# Patient Record
Sex: FEMALE | Race: WHITE | Employment: PART TIME | URBAN - NONMETROPOLITAN AREA
[De-identification: names, ages, dates, MRNs, and addresses within clinical notes are randomized per-mention and may not be internally consistent; named-entity substitution may affect disease eponyms.]

---

## 2019-12-03 ENCOUNTER — HOSPITAL ENCOUNTER (OUTPATIENT)
Dept: ULTRASOUND IMAGING | Age: 29
Discharge: HOME | End: 2019-12-03
Payer: MEDICAID

## 2019-12-03 DIAGNOSIS — E04.1 NONTOXIC SINGLE THYROID NODULE: ICD-10-CM

## 2019-12-03 PROCEDURE — 76536 US EXAM OF HEAD AND NECK: CPT

## 2020-03-23 ENCOUNTER — HOSPITAL ENCOUNTER (EMERGENCY)
Age: 30
Discharge: HOME | End: 2020-03-23
Payer: MEDICAID

## 2020-03-23 VITALS
OXYGEN SATURATION: 99 % | RESPIRATION RATE: 16 BRPM | SYSTOLIC BLOOD PRESSURE: 123 MMHG | HEIGHT: 60 IN | HEART RATE: 91 BPM | TEMPERATURE: 97.5 F | WEIGHT: 140 LBS | BODY MASS INDEX: 27.48 KG/M2 | DIASTOLIC BLOOD PRESSURE: 79 MMHG

## 2020-03-23 LAB
GROUP A STREP CULTURE, REFLEX: NEGATIVE
REFLEX THROAT C + S: NORMAL

## 2020-03-23 PROCEDURE — 12011 RPR F/E/E/N/L/M 2.5 CM/<: CPT | Performed by: NURSE PRACTITIONER

## 2020-03-23 PROCEDURE — 12011 RPR F/E/E/N/L/M 2.5 CM/<: CPT

## 2020-03-23 PROCEDURE — 90471 IMMUNIZATION ADMIN: CPT | Performed by: NURSE PRACTITIONER

## 2020-03-23 PROCEDURE — 87880 STREP A ASSAY W/OPTIC: CPT

## 2020-03-23 PROCEDURE — 6360000002 HC RX W HCPCS: Performed by: NURSE PRACTITIONER

## 2020-03-23 PROCEDURE — 99213 OFFICE O/P EST LOW 20 MIN: CPT | Performed by: NURSE PRACTITIONER

## 2020-03-23 PROCEDURE — 90715 TDAP VACCINE 7 YRS/> IM: CPT | Performed by: NURSE PRACTITIONER

## 2020-03-23 PROCEDURE — 99213 OFFICE O/P EST LOW 20 MIN: CPT

## 2020-03-23 PROCEDURE — 87070 CULTURE OTHR SPECIMN AEROBIC: CPT

## 2020-03-23 RX ORDER — CEPHALEXIN 250 MG/1
250 CAPSULE ORAL 4 TIMES DAILY
Qty: 28 CAPSULE | Refills: 0 | Status: SHIPPED | OUTPATIENT
Start: 2020-03-23 | End: 2020-03-30

## 2020-03-23 RX ADMIN — TETANUS TOXOID, REDUCED DIPHTHERIA TOXOID AND ACELLULAR PERTUSSIS VACCINE, ADSORBED 0.5 ML: 5; 2.5; 8; 8; 2.5 SUSPENSION INTRAMUSCULAR at 11:48

## 2020-03-23 ASSESSMENT — PAIN DESCRIPTION - DESCRIPTORS: DESCRIPTORS: ACHING

## 2020-03-23 ASSESSMENT — PAIN DESCRIPTION - FREQUENCY: FREQUENCY: CONTINUOUS

## 2020-03-23 ASSESSMENT — PAIN DESCRIPTION - PROGRESSION: CLINICAL_PROGRESSION: NOT CHANGED

## 2020-03-23 ASSESSMENT — ENCOUNTER SYMPTOMS
COUGH: 0
COLOR CHANGE: 0
RHINORRHEA: 0
SINUS CONGESTION: 0
CHEST TIGHTNESS: 0
NAUSEA: 0
TROUBLE SWALLOWING: 0
ABDOMINAL PAIN: 0
VOMITING: 0
DIARRHEA: 0
EYE DISCHARGE: 0
SHORTNESS OF BREATH: 0
SORE THROAT: 1
VOICE CHANGE: 0

## 2020-03-23 ASSESSMENT — PAIN DESCRIPTION - ONSET: ONSET: SUDDEN

## 2020-03-23 ASSESSMENT — PAIN DESCRIPTION - ORIENTATION: ORIENTATION: RIGHT

## 2020-03-23 ASSESSMENT — PAIN DESCRIPTION - PAIN TYPE: TYPE: ACUTE PAIN

## 2020-03-23 ASSESSMENT — PAIN DESCRIPTION - LOCATION: LOCATION: FACE

## 2020-03-23 ASSESSMENT — PAIN SCALES - GENERAL: PAINLEVEL_OUTOF10: 5

## 2020-03-23 NOTE — ED TRIAGE NOTES
Pt requests have a strep test, her daughter had strep 2 weeks ago. She has had a sore throat since Friday.

## 2020-03-23 NOTE — ED TRIAGE NOTES
To room with c/o 1cm laceration to right temporal area after falling down stairs. She slid down approx. 5 steps and the metal she was holding in her hand cut her. 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

## 2020-03-23 NOTE — ED PROVIDER NOTES
[facility]       CHIEF COMPLAINT       Chief Complaint   Patient presents with    Laceration    Pharyngitis       Nurses Notes reviewed and I agree except as noted in the HPI. HISTORY OF PRESENT ILLNESS   Elyssa Kelly is a 34 y.o. female who presents     Patient states that she was caring a heavy metal light, down some stairs when she lost her balance causing her to fall and heavy metal light to hit her on the right side of her face causing a small cut to her right periorbital.  She denies passing out when she fell. She is unsure when her last tetanus vaccine was. Patient makes mention that her daughter had tested positive for strep, and she herself has started to develop sore throat, therefore she is concerned that she may also be developing strep.       Pharyngitis   Location:  Generalized  Quality:  Sore  Severity:  Mild  Onset quality:  Gradual  Duration:  2 days  Progression:  Unchanged  Chronicity:  New  Relieved by:  None tried  Worsened by:  Nothing  Ineffective treatments:  None tried  Associated symptoms: no abdominal pain, no adenopathy, no chest pain, no chills, no cough, no drooling, no ear discharge, no ear pain, no epistaxis, no eye discharge, no fever, no headaches, no neck stiffness, no night sweats, no plugged ear sensation, no postnasal drip, no rash, no rhinorrhea, no shortness of breath, no sinus congestion, no trouble swallowing and no voice change    Risk factors: sick contacts (daughter postive with strep)    Risk factors: no exposure to strep, no exposure to mono, no recent dental procedure, no recent endoscopy and no recent ENT procedure      Lac Repair  Date/Time: 3/23/2020 12:00 PM  Performed by: JAMAICA Corona NP  Authorized by: JAMAICA Corona NP     Consent:     Consent obtained:  Verbal    Consent given by:  Patient    Risks discussed:  Infection, pain, poor cosmetic result, poor wound healing and need for additional repair    Alternatives discussed:  No treatment, delayed treatment and observation  Anesthesia (see MAR for exact dosages): Anesthesia method:  None  Laceration details:     Location: facial.    Length (cm):  1  Exploration:     Contaminated: no    Treatment:     Wound cleansed with: wound cleanse. Irrigation method:  Pressure wash    Visualized foreign bodies/material removed: no    Skin repair:     Repair method:  Tissue adhesive  Approximation:     Approximation:  Close  Post-procedure details:     Dressing:  Adhesive bandage    Patient tolerance of procedure: Tolerated well, no immediate complications      REVIEW OF SYSTEMS     Review of Systems   Constitutional: Negative for chills, fatigue, fever and night sweats. HENT: Positive for sore throat. Negative for congestion, drooling, ear discharge, ear pain, mouth sores, nosebleeds, postnasal drip, rhinorrhea, trouble swallowing and voice change. Eyes: Negative for discharge. Respiratory: Negative for cough, chest tightness and shortness of breath. Cardiovascular: Negative for chest pain. Gastrointestinal: Negative for abdominal pain, diarrhea, nausea and vomiting. Musculoskeletal: Negative for neck pain and neck stiffness. Skin: Positive for wound (laceration to right periorbital). Negative for color change, pallor and rash. Neurological: Negative for dizziness, weakness, light-headedness, numbness and headaches. Hematological: Negative for adenopathy. PAST MEDICAL HISTORY   History reviewed. No pertinent past medical history. SURGICALHISTORY     Patient  has no past surgical history on file. CURRENT MEDICATIONS       Previous Medications    No medications on file       ALLERGIES     Patient is has No Known Allergies.     Patients   Immunization History   Administered Date(s) Administered    Tdap (Boostrix, Adacel) 03/23/2020       FAMILY HISTORY     Patient's family history includes High Blood Pressure in her mother. SOCIAL HISTORY     Patient  reports that she has never smoked. She has never used smokeless tobacco. She reports current alcohol use. She reports that she does not use drugs. PHYSICAL EXAM     ED TRIAGE VITALS  BP: 125/76, Temp: 97.5 °F (36.4 °C), Pulse: 93, Resp: 16, SpO2: 99 %,Estimated body mass index is 27.34 kg/m² as calculated from the following:    Height as of this encounter: 5' (1.524 m). Weight as of this encounter: 140 lb (63.5 kg). ,No LMP recorded. Patient has had an injection. Physical Exam  Constitutional:       General: She is not in acute distress. Appearance: Normal appearance. She is not ill-appearing, toxic-appearing or diaphoretic. HENT:      Nose: Nose normal. No congestion or rhinorrhea. Mouth/Throat:      Mouth: Mucous membranes are moist.      Pharynx: Posterior oropharyngeal erythema present. No oropharyngeal exudate. Pulmonary:      Effort: Pulmonary effort is normal. No respiratory distress. Musculoskeletal: Normal range of motion. Skin:     General: Skin is warm. Capillary Refill: Capillary refill takes less than 2 seconds. Findings: Laceration present. No abrasion, abscess, acne, bruising, burn, ecchymosis, erythema, signs of injury, lesion, petechiae, rash or wound. Neurological:      General: No focal deficit present. Mental Status: She is alert and oriented to person, place, and time. Sensory: No sensory deficit. Psychiatric:         Mood and Affect: Mood normal.         Behavior: Behavior normal.         Thought Content:  Thought content normal.         Judgment: Judgment normal.         DIAGNOSTIC RESULTS     Labs:  Results for orders placed or performed during the hospital encounter of 03/23/20   STREP A ANTIGEN   Result Value Ref Range    GROUP A STREP CULTURE, REFLEX NEGATIVE        IMAGING:    No orders to display     URGENT CARE COURSE:     Vitals:    03/23/20 1138   BP: 125/76   Pulse: 93   Resp: 16   Temp: 97.5 °F (36.4 °C)   TempSrc: Temporal   SpO2: 99%   Weight: 140 lb (63.5 kg)   Height: 5' (1.524 m)       Medications   Tetanus-Diphth-Acell Pertussis (BOOSTRIX) injection 0.5 mL (0.5 mLs Intramuscular Given 3/23/20 1148)            PROCEDURES:  None    FINAL IMPRESSION      1. Acute pharyngitis, unspecified etiology    2. Facial laceration, initial encounter          DISPOSITION/ PLAN   Patient is discharged home with prescription for Keflex that she may begin taking today for prophylactic treatment of laceration to right periorbital.  Discussed with patient that strep swab was negative, however will be sent for culture. Discussed with patient that she should continue to monitor for any signs of infection such as increased tenderness, redness, or swelling. Patient should refrain from applying any ointment to site, as this could dissolve glue prematurely. Adhesive will dissolve on its own within the next 5 to 7 days.         PATIENT REFERRED TO:  JAMAICA Lantigua - BENSON  [address] [address] [facility] [address]      DISCHARGE MEDICATIONS:  New Prescriptions    CEPHALEXIN (KEFLEX) 250 MG CAPSULE    Take 1 capsule by mouth 4 times daily for 7 days       Discontinued Medications    BENZONATATE (TESSALON PERLES) 100 MG CAPSULE    Take 1 capsule by mouth 3 times daily as needed for Cough    CETIRIZINE HCL (ZYRTEC ALLERGY) 10 MG CAPS    Take 1 tablet by mouth daily    FLUTICASONE (FLONASE) 50 MCG/ACT NASAL SPRAY    1 spray by Nasal route daily    PSEUDOEPHEDRINE (SUDAFED 12 HOUR) 120 MG CR TABLET    Take 1 tablet by mouth every 12 hours       Current Discharge Medication List          JAMAICA Walker NP    (Please note that portions of this note were completed with a voice recognition program. Efforts were made to edit the dictations but occasionally words are mis-transcribed.)         JAMAICA Mata NP  03/23/20 [id number]

## 2020-03-25 LAB — THROAT/NOSE CULTURE: NORMAL

## 2021-01-21 ENCOUNTER — HOSPITAL ENCOUNTER (EMERGENCY)
Age: 31
Discharge: HOME | End: 2021-01-21
Payer: MEDICAID

## 2021-01-21 VITALS
RESPIRATION RATE: 16 BRPM | SYSTOLIC BLOOD PRESSURE: 117 MMHG | DIASTOLIC BLOOD PRESSURE: 73 MMHG | BODY MASS INDEX: 29.29 KG/M2 | WEIGHT: 150 LBS | OXYGEN SATURATION: 97 % | HEART RATE: 128 BPM | TEMPERATURE: 100.1 F

## 2021-01-21 DIAGNOSIS — Z20.822 ENCOUNTER FOR LABORATORY TESTING FOR COVID-19 VIRUS: ICD-10-CM

## 2021-01-21 DIAGNOSIS — R11.14 BILIOUS VOMITING WITH NAUSEA: Primary | ICD-10-CM

## 2021-01-21 LAB
FLU A ANTIGEN: NEGATIVE
FLU B ANTIGEN: NEGATIVE

## 2021-01-21 PROCEDURE — U0003 INFECTIOUS AGENT DETECTION BY NUCLEIC ACID (DNA OR RNA); SEVERE ACUTE RESPIRATORY SYNDROME CORONAVIRUS 2 (SARS-COV-2) (CORONAVIRUS DISEASE [COVID-19]), AMPLIFIED PROBE TECHNIQUE, MAKING USE OF HIGH THROUGHPUT TECHNOLOGIES AS DESCRIBED BY CMS-2020-01-R: HCPCS

## 2021-01-21 PROCEDURE — 99213 OFFICE O/P EST LOW 20 MIN: CPT

## 2021-01-21 PROCEDURE — 99214 OFFICE O/P EST MOD 30 MIN: CPT | Performed by: NURSE PRACTITIONER

## 2021-01-21 PROCEDURE — 87804 INFLUENZA ASSAY W/OPTIC: CPT

## 2021-01-21 RX ORDER — ONDANSETRON 4 MG/1
4 TABLET, ORALLY DISINTEGRATING ORAL EVERY 8 HOURS PRN
Qty: 12 TABLET | Refills: 0 | Status: SHIPPED | OUTPATIENT
Start: 2021-01-21

## 2021-01-21 ASSESSMENT — ENCOUNTER SYMPTOMS
NAUSEA: 1
COUGH: 0
SORE THROAT: 0
ABDOMINAL PAIN: 0
DIARRHEA: 0
VOMITING: 1
SHORTNESS OF BREATH: 0

## 2021-01-21 ASSESSMENT — PAIN DESCRIPTION - FREQUENCY: FREQUENCY: CONTINUOUS

## 2021-01-21 ASSESSMENT — PAIN DESCRIPTION - DESCRIPTORS: DESCRIPTORS: ACHING

## 2021-01-21 NOTE — ED PROVIDER NOTES
[facility]  [facility]       CHIEF COMPLAINT       Chief Complaint   Patient presents with    Emesis     headache- onset sunday- body aches       Nurses Notes reviewed and I agree except as noted in the HPI. HISTORY OF PRESENT ILLNESS   Elyssa Pizarro is a 31 y.o. female who presents with complaints of headache and nausea vomiting for the past 4 days. Nausea & Vomiting  Severity:  Severe  Duration:  4 days  Timing:  Constant  Number of daily episodes:  5  Quality:  Bilious material and stomach contents  Progression:  Unchanged  Chronicity:  New  Recent urination:  Normal  Context: not self-induced    Relieved by:  Nothing  Worsened by:  Nothing  Ineffective treatments:  Liquids  Associated symptoms: fever (100.1), headaches, myalgias and URI    Associated symptoms: no abdominal pain, no cough, no diarrhea and no sore throat    Headaches:     Severity:  Mild    Onset quality:  Sudden    Duration:  3 days    Timing:  Constant    Progression:  Unchanged    Chronicity:  New  Risk factors: no alcohol use, no sick contacts and no suspect food intake        REVIEW OF SYSTEMS     Review of Systems   Constitutional: Positive for fever (100.1). HENT: Negative for congestion and sore throat. Respiratory: Negative for cough and shortness of breath. Cardiovascular: Negative for chest pain. Gastrointestinal: Positive for nausea and vomiting. Negative for abdominal pain and diarrhea. Musculoskeletal: Positive for myalgias. Neurological: Positive for headaches. PAST MEDICAL HISTORY   History reviewed. No pertinent past medical history. SURGICALHISTORY     Patient  has no past surgical history on file. CURRENT MEDICATIONS       Discharge Medication List as of 1/21/2021 12:44 PM          ALLERGIES     Patient is has No Known Allergies.     Patients   Immunization History   Administered Date(s) Administered    Tdap (Boostrix, Adacel) 03/23/2020       FAMILY HISTORY Patient's family history includes High Blood Pressure in her mother. SOCIAL HISTORY     Patient  reports that she has never smoked. She has never used smokeless tobacco. She reports current alcohol use. She reports that she does not use drugs. PHYSICAL EXAM     ED TRIAGE VITALS  BP: 117/73, Temp: 100.1 °F (37.8 °C), Pulse: 128, Resp: 16, SpO2: 97 %,Estimated body mass index is 29.29 kg/m² as calculated from the following:    Height as of 3/23/20: 5' (1.524 m). Weight as of this encounter: 150 lb (68 kg). ,No LMP recorded. Patient has had an injection. Physical Exam  Vitals signs and nursing note reviewed. Constitutional:       General: She is not in acute distress. Appearance: She is ill-appearing. HENT:      Right Ear: Tympanic membrane, ear canal and external ear normal.      Left Ear: Tympanic membrane, ear canal and external ear normal.      Nose: Nose normal.      Mouth/Throat:      Mouth: Mucous membranes are moist.      Pharynx: Posterior oropharyngeal erythema present. Neck:      Musculoskeletal: No muscular tenderness. Cardiovascular:      Rate and Rhythm: Normal rate and regular rhythm. Heart sounds: Normal heart sounds. Pulmonary:      Effort: Pulmonary effort is normal.      Breath sounds: Normal breath sounds. No wheezing or rales. Abdominal:      General: Abdomen is flat. Bowel sounds are normal. There is no distension. Palpations: Abdomen is soft. Tenderness: There is no abdominal tenderness. Lymphadenopathy:      Cervical: No cervical adenopathy. Skin:     General: Skin is warm and dry. Neurological:      Mental Status: She is alert and oriented to person, place, and time.    Psychiatric:         Behavior: Behavior normal.       DIAGNOSTIC RESULTS     Labs:  Results for orders placed or performed during the hospital encounter of 01/21/21   Rapid influenza A/B antigens   Result Value Ref Range    Flu A Antigen Negative NEGATIVE    Flu B Antigen

## 2021-01-21 NOTE — ED NOTES
Covid nasal pharyngeal swab obtained and sent to lab. Proper ppe worn during procedure. Pt tolerated well.      Soila Garcia LPN  66/14/17 [id number]

## 2021-01-22 ENCOUNTER — CARE COORDINATION (OUTPATIENT)
Dept: CARE COORDINATION | Age: 31
End: 2021-01-22

## 2021-01-22 NOTE — CARE COORDINATION
Attempted to reach patient for ED follow up in regards to COVID-19 education/ monitoring. Patient was unavailable at the time of my call, and a generic voicemail message was left asking patient to return my call at [phone].

## 2021-01-22 NOTE — CARE COORDINATION
Second Attemp to reach patient for ED follow up in regards to COVID-19 education/ monitoring. Patient was unavailable at the time of my call, and no vm available. Will resolve at this time.

## 2021-01-23 LAB — SARS-COV-2: NOT DETECTED

## 2022-08-31 NOTE — ACP (ADVANCE CARE PLANNING)
Advance Care Planning   Healthcare Decision Maker:      Click here to complete Healthcare Decision Makers including selection of the Healthcare Decision Maker Relationship (ie \"Primary\"). Today we documented Decision Maker(s) consistent with Legal Next of Kin hierarchy.
No

## 2025-03-04 ENCOUNTER — OFFICE VISIT (OUTPATIENT)
Dept: FAMILY MEDICINE CLINIC | Age: 35
End: 2025-03-04
Payer: MEDICAID

## 2025-03-04 VITALS
SYSTOLIC BLOOD PRESSURE: 124 MMHG | TEMPERATURE: 97.7 F | RESPIRATION RATE: 14 BRPM | OXYGEN SATURATION: 98 % | HEIGHT: 60 IN | BODY MASS INDEX: 30.08 KG/M2 | WEIGHT: 153.2 LBS | HEART RATE: 91 BPM | DIASTOLIC BLOOD PRESSURE: 78 MMHG

## 2025-03-04 DIAGNOSIS — R10.9 LEFT SIDED ABDOMINAL PAIN: Primary | ICD-10-CM

## 2025-03-04 PROCEDURE — G8427 DOCREV CUR MEDS BY ELIG CLIN: HCPCS | Performed by: NURSE PRACTITIONER

## 2025-03-04 PROCEDURE — 99203 OFFICE O/P NEW LOW 30 MIN: CPT | Performed by: NURSE PRACTITIONER

## 2025-03-04 PROCEDURE — G8419 CALC BMI OUT NRM PARAM NOF/U: HCPCS | Performed by: NURSE PRACTITIONER

## 2025-03-04 PROCEDURE — 1036F TOBACCO NON-USER: CPT | Performed by: NURSE PRACTITIONER

## 2025-03-04 SDOH — HEALTH STABILITY: PHYSICAL HEALTH: ON AVERAGE, HOW MANY DAYS PER WEEK DO YOU ENGAGE IN MODERATE TO STRENUOUS EXERCISE (LIKE A BRISK WALK)?: 1 DAY

## 2025-03-04 SDOH — HEALTH STABILITY: PHYSICAL HEALTH: ON AVERAGE, HOW MANY MINUTES DO YOU ENGAGE IN EXERCISE AT THIS LEVEL?: 30 MIN

## 2025-03-04 ASSESSMENT — PATIENT HEALTH QUESTIONNAIRE - PHQ9
2. FEELING DOWN, DEPRESSED OR HOPELESS: NOT AT ALL
SUM OF ALL RESPONSES TO PHQ QUESTIONS 1-9: 0
1. LITTLE INTEREST OR PLEASURE IN DOING THINGS: NOT AT ALL
SUM OF ALL RESPONSES TO PHQ QUESTIONS 1-9: 0

## 2025-03-04 NOTE — PROGRESS NOTES
painful urination,Change in frequency, Urgency  Skin:  Negative for Color change, Rash, Itching, Wound  Psychiatric:  Negative forAnxiety, Depression, Suicidal ideation  Musculoskeletal:  Negative for Joint pain, Back pain, Gait problems  Neurological:  Negative for Dizziness, Headaches    PHYSICAL EXAM:  /78   Pulse 91   Temp 97.7 °F (36.5 °C) (Temporal)   Resp 14   Ht 1.524 m (5')   Wt 69.5 kg (153 lb 3.2 oz)   SpO2 98%   BMI 29.92 kg/m²     Physical Exam    PHYSICAL EXAM:   VITALS:   Vitals:    03/04/25 0952   BP: 124/78   Pulse: 91   Resp: 14   Temp: 97.7 °F (36.5 °C)   SpO2: 98%     GENERAL:  Patient is a well-developed, well-nourished female  in no acute distress, alert and oriented x3, appropriate and pleasant conversation.   HEAD: Normocephalic, atraumatic.   EYES: Pupils equal, round and reactive to light and accommodation, extraocular   movements intact.   ENT: Moist mucous membranes. No erythema is noted.   NECK: Supple. No masses. No lymphadenopathy.   CARDIOVASCULAR: Regular rate and rhythm.   PULMONARY: Lungs are clear to auscultation bilaterally.   ABDOMEN: Soft, nontender, nondistended. Positive bowel sounds.  Abdominal exam benign when lying down, when standing with deep palpation in left lower quadrant a mass is palpated  MUSCULOSKELETAL: Strength 5/5 bilaterally in all extremities. No tenderness to   palpation of the ribs, long bones, or spine.     ASSESSMENT & PLAN  1. Left sided abdominal pain  Noted after surgery from having fallopian tube removed  Worsening  Hernia?  Will check US first  - US ABDOMEN COMPLETE; Future  Will get records from Dr Rosas office    No follow-ups on file.        All copied or forwarded information in the progress note was verified by me to be accurate at the time of visit  Patient's past medical, surgical, social and family history were reviewed and updated     All patient questions answered.  Patient voiced understanding.

## 2025-03-06 ENCOUNTER — HOSPITAL ENCOUNTER (OUTPATIENT)
Dept: ULTRASOUND IMAGING | Age: 35
Discharge: HOME | End: 2025-03-06
Payer: MEDICAID

## 2025-03-06 ENCOUNTER — TELEPHONE (OUTPATIENT)
Dept: FAMILY MEDICINE CLINIC | Age: 35
End: 2025-03-06

## 2025-03-06 DIAGNOSIS — R10.9 LEFT SIDED ABDOMINAL PAIN: ICD-10-CM

## 2025-03-06 DIAGNOSIS — K43.9 VENTRAL HERNIA WITHOUT OBSTRUCTION OR GANGRENE: Primary | ICD-10-CM

## 2025-03-06 PROCEDURE — 76705 ECHO EXAM OF ABDOMEN: CPT

## 2025-03-06 NOTE — TELEPHONE ENCOUNTER
Pt informed of US results and recommendations . Pt voiced understanding with no further questions at this time.     Pt is agreeable to referral and is informed that they will call her once the referral is received

## 2025-03-06 NOTE — TELEPHONE ENCOUNTER
----- Message from JAMAICA Giraldo CNP sent at 3/6/2025  9:07 AM EST -----  Us shows ventral abdominal wall hernia, I can offer general surgery referral if shed like to proceed with possible surgery to fix the hernia.

## 2025-04-04 ENCOUNTER — PREP FOR PROCEDURE (OUTPATIENT)
Dept: SURGERY | Age: 35
End: 2025-04-04

## 2025-04-04 ENCOUNTER — OFFICE VISIT (OUTPATIENT)
Dept: SURGERY | Age: 35
End: 2025-04-04

## 2025-04-04 VITALS
WEIGHT: 152.7 LBS | HEART RATE: 87 BPM | BODY MASS INDEX: 29.98 KG/M2 | HEIGHT: 60 IN | OXYGEN SATURATION: 98 % | SYSTOLIC BLOOD PRESSURE: 116 MMHG | DIASTOLIC BLOOD PRESSURE: 82 MMHG | TEMPERATURE: 98.2 F

## 2025-04-04 DIAGNOSIS — K43.9 SPIGELIAN HERNIA: Primary | ICD-10-CM

## 2025-04-04 DIAGNOSIS — K43.9 SPIGELIAN HERNIA: ICD-10-CM

## 2025-04-04 NOTE — PROGRESS NOTES
adenopathy, thyroid symmetric, not enlarged and no tenderness, skin normal.  CHEST/LUNGS: chest symmetric with normal A/P diameter, normal respiratory rate and rhythm, lungs clear to auscultation without wheezes, rales or rhonchi. No accessory muscle use. Scars None   CARDIOVASCULAR: Heart sounds are normal.  Regular rate and rhythm without murmur, gallop or rub. Normal S1 and S2. Carotid and femoral pulses 2+/4 and equal bilaterally.  ABDOMEN: Normal shape. Laparoscopic scar(s) present. Normal bowel sounds.  No bruits. soft, nontender, nondistended, no masses or organomegaly. In the LLQ is a palpable mass that is not reducible. Spigelian hernia versus lipoma.  Percussion: Normal without hepatosplenomegally.   RECTAL: deferred, not clinically indicated  NEUROLOGIC: There are no focalizing motor or sensory deficits. CN II-XII are grossly intact..   EXTREMITIES: no cyanosis, no clubbing, and no edema.      Thank you for the interesting evaluation. Further recommendations as listed above.       Electronically signed by Foster Nassar DO on 4/4/2025 at 2:44 PM

## 2025-04-11 RX ORDER — SODIUM CHLORIDE 9 MG/ML
INJECTION, SOLUTION INTRAVENOUS PRN
Status: CANCELLED | OUTPATIENT
Start: 2025-04-11

## 2025-04-11 RX ORDER — SODIUM CHLORIDE 0.9 % (FLUSH) 0.9 %
5-40 SYRINGE (ML) INJECTION EVERY 12 HOURS SCHEDULED
Status: CANCELLED | OUTPATIENT
Start: 2025-04-11

## 2025-04-11 RX ORDER — SODIUM CHLORIDE 0.9 % (FLUSH) 0.9 %
5-40 SYRINGE (ML) INJECTION PRN
Status: CANCELLED | OUTPATIENT
Start: 2025-04-11

## 2025-04-11 RX ORDER — SODIUM CHLORIDE 9 MG/ML
INJECTION, SOLUTION INTRAVENOUS CONTINUOUS
Status: CANCELLED | OUTPATIENT
Start: 2025-04-11

## 2025-04-17 ENCOUNTER — HOSPITAL ENCOUNTER (OUTPATIENT)
Age: 35
Setting detail: OUTPATIENT SURGERY
Discharge: HOME | End: 2025-04-17
Attending: SURGERY | Admitting: SURGERY
Payer: MEDICAID

## 2025-04-17 ENCOUNTER — ANESTHESIA EVENT (OUTPATIENT)
Dept: OPERATING ROOM | Age: 35
End: 2025-04-17
Payer: MEDICAID

## 2025-04-17 ENCOUNTER — ANESTHESIA (OUTPATIENT)
Dept: OPERATING ROOM | Age: 35
End: 2025-04-17
Payer: MEDICAID

## 2025-04-17 VITALS
SYSTOLIC BLOOD PRESSURE: 110 MMHG | HEIGHT: 60 IN | DIASTOLIC BLOOD PRESSURE: 61 MMHG | TEMPERATURE: 97.4 F | BODY MASS INDEX: 29.45 KG/M2 | OXYGEN SATURATION: 94 % | WEIGHT: 150 LBS | RESPIRATION RATE: 18 BRPM | HEART RATE: 104 BPM

## 2025-04-17 DIAGNOSIS — G89.18 ACUTE POSTOPERATIVE PAIN: Primary | ICD-10-CM

## 2025-04-17 LAB — PREGNANCY, URINE: NEGATIVE

## 2025-04-17 PROCEDURE — 49591 RPR AA HRN 1ST < 3 CM RDC: CPT | Performed by: SURGERY

## 2025-04-17 PROCEDURE — 3600000012 HC SURGERY LEVEL 2 ADDTL 15MIN: Performed by: SURGERY

## 2025-04-17 PROCEDURE — C1781 MESH (IMPLANTABLE): HCPCS | Performed by: SURGERY

## 2025-04-17 PROCEDURE — 2500000003 HC RX 250 WO HCPCS: Performed by: NURSE ANESTHETIST, CERTIFIED REGISTERED

## 2025-04-17 PROCEDURE — 7100000010 HC PHASE II RECOVERY - FIRST 15 MIN: Performed by: SURGERY

## 2025-04-17 PROCEDURE — 6360000002 HC RX W HCPCS: Performed by: NURSE ANESTHETIST, CERTIFIED REGISTERED

## 2025-04-17 PROCEDURE — 2709999900 HC NON-CHARGEABLE SUPPLY: Performed by: SURGERY

## 2025-04-17 PROCEDURE — 7100000001 HC PACU RECOVERY - ADDTL 15 MIN: Performed by: SURGERY

## 2025-04-17 PROCEDURE — 7100000011 HC PHASE II RECOVERY - ADDTL 15 MIN: Performed by: SURGERY

## 2025-04-17 PROCEDURE — 6360000002 HC RX W HCPCS: Performed by: SURGERY

## 2025-04-17 PROCEDURE — 6360000002 HC RX W HCPCS: Performed by: STUDENT IN AN ORGANIZED HEALTH CARE EDUCATION/TRAINING PROGRAM

## 2025-04-17 PROCEDURE — 6370000000 HC RX 637 (ALT 250 FOR IP): Performed by: SURGERY

## 2025-04-17 PROCEDURE — 3700000001 HC ADD 15 MINUTES (ANESTHESIA): Performed by: SURGERY

## 2025-04-17 PROCEDURE — 81025 URINE PREGNANCY TEST: CPT

## 2025-04-17 PROCEDURE — 3600000002 HC SURGERY LEVEL 2 BASE: Performed by: SURGERY

## 2025-04-17 PROCEDURE — 7100000000 HC PACU RECOVERY - FIRST 15 MIN: Performed by: SURGERY

## 2025-04-17 PROCEDURE — 2580000003 HC RX 258: Performed by: SURGERY

## 2025-04-17 PROCEDURE — 2500000003 HC RX 250 WO HCPCS: Performed by: SURGERY

## 2025-04-17 PROCEDURE — 3700000000 HC ANESTHESIA ATTENDED CARE: Performed by: SURGERY

## 2025-04-17 DEVICE — IMPLANTABLE DEVICE: Type: IMPLANTABLE DEVICE | Status: FUNCTIONAL

## 2025-04-17 RX ORDER — ONDANSETRON 2 MG/ML
4 INJECTION INTRAMUSCULAR; INTRAVENOUS EVERY 6 HOURS PRN
Status: DISCONTINUED | OUTPATIENT
Start: 2025-04-17 | End: 2025-04-17 | Stop reason: HOSPADM

## 2025-04-17 RX ORDER — MIDAZOLAM HYDROCHLORIDE 1 MG/ML
INJECTION, SOLUTION INTRAMUSCULAR; INTRAVENOUS
Status: DISCONTINUED | OUTPATIENT
Start: 2025-04-17 | End: 2025-04-17 | Stop reason: SDUPTHER

## 2025-04-17 RX ORDER — DEXAMETHASONE SODIUM PHOSPHATE 4 MG/ML
INJECTION, SOLUTION INTRA-ARTICULAR; INTRALESIONAL; INTRAMUSCULAR; INTRAVENOUS; SOFT TISSUE
Status: DISCONTINUED | OUTPATIENT
Start: 2025-04-17 | End: 2025-04-17 | Stop reason: SDUPTHER

## 2025-04-17 RX ORDER — SODIUM CHLORIDE 9 MG/ML
INJECTION, SOLUTION INTRAVENOUS CONTINUOUS
Status: DISCONTINUED | OUTPATIENT
Start: 2025-04-17 | End: 2025-04-17 | Stop reason: HOSPADM

## 2025-04-17 RX ORDER — SODIUM CHLORIDE 0.9 % (FLUSH) 0.9 %
5-40 SYRINGE (ML) INJECTION PRN
Status: DISCONTINUED | OUTPATIENT
Start: 2025-04-17 | End: 2025-04-17 | Stop reason: HOSPADM

## 2025-04-17 RX ORDER — ONDANSETRON 2 MG/ML
INJECTION INTRAMUSCULAR; INTRAVENOUS
Status: DISCONTINUED | OUTPATIENT
Start: 2025-04-17 | End: 2025-04-17 | Stop reason: SDUPTHER

## 2025-04-17 RX ORDER — PROPOFOL 10 MG/ML
INJECTION, EMULSION INTRAVENOUS
Status: DISCONTINUED | OUTPATIENT
Start: 2025-04-17 | End: 2025-04-17 | Stop reason: SDUPTHER

## 2025-04-17 RX ORDER — SODIUM CHLORIDE 9 MG/ML
INJECTION, SOLUTION INTRAVENOUS PRN
Status: DISCONTINUED | OUTPATIENT
Start: 2025-04-17 | End: 2025-04-17 | Stop reason: HOSPADM

## 2025-04-17 RX ORDER — FENTANYL CITRATE 50 UG/ML
50 INJECTION, SOLUTION INTRAMUSCULAR; INTRAVENOUS EVERY 5 MIN PRN
Status: DISCONTINUED | OUTPATIENT
Start: 2025-04-17 | End: 2025-04-17 | Stop reason: HOSPADM

## 2025-04-17 RX ORDER — MEPERIDINE HYDROCHLORIDE 25 MG/ML
12.5 INJECTION INTRAMUSCULAR; INTRAVENOUS; SUBCUTANEOUS EVERY 5 MIN PRN
Status: DISCONTINUED | OUTPATIENT
Start: 2025-04-17 | End: 2025-04-17 | Stop reason: HOSPADM

## 2025-04-17 RX ORDER — SODIUM CHLORIDE 0.9 % (FLUSH) 0.9 %
5-40 SYRINGE (ML) INJECTION EVERY 12 HOURS SCHEDULED
Status: DISCONTINUED | OUTPATIENT
Start: 2025-04-17 | End: 2025-04-17 | Stop reason: HOSPADM

## 2025-04-17 RX ORDER — HYDROCODONE BITARTRATE AND ACETAMINOPHEN 5; 325 MG/1; MG/1
1 TABLET ORAL EVERY 4 HOURS PRN
Status: DISCONTINUED | OUTPATIENT
Start: 2025-04-17 | End: 2025-04-17 | Stop reason: HOSPADM

## 2025-04-17 RX ORDER — DIPHENHYDRAMINE HYDROCHLORIDE 50 MG/ML
12.5 INJECTION, SOLUTION INTRAMUSCULAR; INTRAVENOUS
Status: DISCONTINUED | OUTPATIENT
Start: 2025-04-17 | End: 2025-04-17 | Stop reason: HOSPADM

## 2025-04-17 RX ORDER — NALOXONE HYDROCHLORIDE 0.4 MG/ML
INJECTION, SOLUTION INTRAMUSCULAR; INTRAVENOUS; SUBCUTANEOUS PRN
Status: DISCONTINUED | OUTPATIENT
Start: 2025-04-17 | End: 2025-04-17 | Stop reason: HOSPADM

## 2025-04-17 RX ORDER — ONDANSETRON 2 MG/ML
4 INJECTION INTRAMUSCULAR; INTRAVENOUS
Status: DISCONTINUED | OUTPATIENT
Start: 2025-04-17 | End: 2025-04-17 | Stop reason: HOSPADM

## 2025-04-17 RX ORDER — BUPIVACAINE HYDROCHLORIDE 5 MG/ML
INJECTION, SOLUTION EPIDURAL; INTRACAUDAL; PERINEURAL PRN
Status: DISCONTINUED | OUTPATIENT
Start: 2025-04-17 | End: 2025-04-17 | Stop reason: ALTCHOICE

## 2025-04-17 RX ORDER — FENTANYL CITRATE 50 UG/ML
INJECTION, SOLUTION INTRAMUSCULAR; INTRAVENOUS
Status: DISCONTINUED | OUTPATIENT
Start: 2025-04-17 | End: 2025-04-17 | Stop reason: SDUPTHER

## 2025-04-17 RX ORDER — HYDROCODONE BITARTRATE AND ACETAMINOPHEN 5; 325 MG/1; MG/1
1 TABLET ORAL EVERY 4 HOURS PRN
Qty: 30 TABLET | Refills: 0 | Status: SHIPPED | OUTPATIENT
Start: 2025-04-17 | End: 2025-04-24

## 2025-04-17 RX ORDER — LIDOCAINE HYDROCHLORIDE 20 MG/ML
INJECTION, SOLUTION INTRAVENOUS
Status: DISCONTINUED | OUTPATIENT
Start: 2025-04-17 | End: 2025-04-17 | Stop reason: SDUPTHER

## 2025-04-17 RX ORDER — ROCURONIUM BROMIDE 10 MG/ML
INJECTION, SOLUTION INTRAVENOUS
Status: DISCONTINUED | OUTPATIENT
Start: 2025-04-17 | End: 2025-04-17 | Stop reason: SDUPTHER

## 2025-04-17 RX ADMIN — MIDAZOLAM 2 MG: 1 INJECTION INTRAMUSCULAR; INTRAVENOUS at 09:12

## 2025-04-17 RX ADMIN — FENTANYL CITRATE 50 MCG: 50 INJECTION, SOLUTION INTRAMUSCULAR; INTRAVENOUS at 10:01

## 2025-04-17 RX ADMIN — DEXAMETHASONE SODIUM PHOSPHATE 8 MG: 4 INJECTION, SOLUTION INTRAMUSCULAR; INTRAVENOUS at 09:21

## 2025-04-17 RX ADMIN — FENTANYL CITRATE 100 MCG: 50 INJECTION, SOLUTION INTRAMUSCULAR; INTRAVENOUS at 09:14

## 2025-04-17 RX ADMIN — HYDROCODONE BITARTRATE AND ACETAMINOPHEN 1 TABLET: 5; 325 TABLET ORAL at 10:34

## 2025-04-17 RX ADMIN — SODIUM CHLORIDE: 9 INJECTION, SOLUTION INTRAVENOUS at 09:11

## 2025-04-17 RX ADMIN — LIDOCAINE HYDROCHLORIDE 100 MG: 20 INJECTION, SOLUTION INTRAVENOUS at 09:15

## 2025-04-17 RX ADMIN — ONDANSETRON 4 MG: 2 INJECTION, SOLUTION INTRAMUSCULAR; INTRAVENOUS at 09:21

## 2025-04-17 RX ADMIN — SUGAMMADEX 200 MG: 100 INJECTION, SOLUTION INTRAVENOUS at 09:36

## 2025-04-17 RX ADMIN — FENTANYL CITRATE 50 MCG: 50 INJECTION, SOLUTION INTRAMUSCULAR; INTRAVENOUS at 09:53

## 2025-04-17 RX ADMIN — WATER 2000 MG: 1 INJECTION INTRAMUSCULAR; INTRAVENOUS; SUBCUTANEOUS at 09:17

## 2025-04-17 RX ADMIN — PROPOFOL 150 MG: 10 INJECTION, EMULSION INTRAVENOUS at 09:15

## 2025-04-17 RX ADMIN — ROCURONIUM BROMIDE 50 MG: 10 INJECTION, SOLUTION INTRAVENOUS at 09:15

## 2025-04-17 ASSESSMENT — PAIN SCALES - GENERAL
PAINLEVEL_OUTOF10: 7

## 2025-04-17 ASSESSMENT — PAIN - FUNCTIONAL ASSESSMENT
PAIN_FUNCTIONAL_ASSESSMENT: NONE - DENIES PAIN
PAIN_FUNCTIONAL_ASSESSMENT: NONE - DENIES PAIN

## 2025-04-17 ASSESSMENT — PAIN DESCRIPTION - LOCATION
LOCATION: ABDOMEN

## 2025-04-17 ASSESSMENT — PAIN DESCRIPTION - DESCRIPTORS: DESCRIPTORS: ACHING

## 2025-04-17 NOTE — DISCHARGE INSTRUCTIONS
DR. WASHBURN'S DISCHARGE INSTRUCTIONS    Pt Name: Elyssa Hutchins  Medical Record Number: [id number]  Today's Date: 4/17/2025  GENERAL ANESTHESIA OR SEDATION   1. Do not drive or operate hazardous machinery for 24 hours.  2. Do not make important business or personal decisions for 24 hours.  3. Do not drink alcoholic beverages or use tobacco for 24 hours.  ACTIVITY INSTRUCTIONS   Do not drive for 3-5 days and avoid heavy lifting, tugging, pullings greater than 10-20 lbs until seen in the office.    DIET INSTRUCTIONS   Regular diet as tolerated.  MEDICATIONS   Prescription written for Norco. Take as directed.  Do not drink alcohol or drive while taking pain medications. You may experience dizziness or drowsiness with these medications. You may also experience constipation which can be relieved with stool softners or laxatives.  You may resume your daily prescription medication schedule unless otherwise specified.  Do not take 325mg Aspirin or other blood thinners such as Coumadin or Plavix for 5 days.  WOUND & DRESSING INSTRUCTIONS   Always ensure you and your care giver clean hands before and after caring for the wound.  Keep dressing clean and dry for 48 hours. Change when soiled or wet.      Allow steri-strips to fall off on their own.   Ice operative site for 20 minutes 4 times a day.     May wash over incision in shower in 48 hours, but do not soak in a bath.  ABDOMINAL & LAPAROSCOPIC PROCEDURES   1. You are encouraged to get up and move around as this helps with the circulation and speeds up the healing process.  2. Breath deeply and cough from time to time. This helps to clear your lungs and helps prevent pneumonia.  3. Supporting your incision with a pillow or your hand helps to minimize discomfort and pain.  FOLLOW UP CARE, SPECIFICALLY WATCH FOR:    Fever over 101 degrees by mouth   Increased redness, warmth, hardness at operative site.   Blood soaked dressing (small amounts of oozing may be

## 2025-04-17 NOTE — PROGRESS NOTES
0947-patient to Phase I in cart. Report received from Kayden AMARAL and Prudence SHABAZZ. Patient placed on cardiac monitor. Vitals obtained and stable. Respirations even and unlabored on room air. Dressing in place on left lower quadrant of abdomen. No drainage noted. Patient denies nausea. Patient awake and alert.  0954-patient complains of pain 7/10. Medicated with fentanyl per order. See MAR. VSS.   1000-Patient states pain still 7/10. Medicated with fentanyl per order. VSS. See MAR.  1010-Patient states pain has improved. Rates pain 4/10. Denies nausea.  1020-patient meets criteria to move to Phase II. Tolerating ice chips. States pain is tolerable. Denies nausea. Dressing remains clean, dry, and intact.  1025-Patient provided with snack and drink. Denies needs. Call light remains in reach.  1035-Patient tolerating snack and drink. Medicated with pain medication as prescribed. See MAR. Family in room/  1055-patient states pain is tolerable. Discharge instructions reviewed. Verbalized understanding. Patient getting dressed in room.  1105-Patient meets discharge criteria.  Discharged in stable condition with responsible . All belongings given to patient. Patient ambulated to car with assistance from RN. Patient tolerated well.

## 2025-04-17 NOTE — OP NOTE
[facility]  RECORD OF OPERATION  PATIENT NAME: Elyssa Hutchins  MEDICAL RECORD NO. 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  SURGEON:  Dr. Foster Nassar.  ANESTHESIA:  general  ESTIMATED BLOOD LOSS:  5 ml  SPECIMEN:  None.  COMPLICATIONS:  None immediately appreciated.     DISCUSSION: Elyssa is a 34 y.o.-year-old female who presented to my office and seen in evaluation at the request of Gloria Baker APRN - CNP regarding a spigelian hernia. After history and physical examination was performed, potential diagnostic and therapeutic modalities discussed with the patient, operative and nonoperative management was discussed, risks, complications, and benefits reviewed. She was given the opportunity to ask questions, and once answered, informed consent was obtained. She was brought to the Operating Room on 4/17/2025 for the procedure.     OPERATIVE FINDINGS: At the time of exploration, the patient had a 1 cm defect in the fascia with herniated preperitoneal fat which was noted to be viable and a mesh repair was performed as described below.     PROCEDURE:  The patient was brought to the Operating Room and placed in a supine position, placed under continuous cardiac telemetry, blood pressure, and pulse oximetry monitoring and placed under general by the Anesthesia Department. The anterior abdominal wall was prepped and draped in a sterile fashion. A transverse incision was made over the defect and carried down to subcutaneous tissues. Subcutaneous tissue dissection with electrocautery, The herniated contents were dissected free from surrounding tissues and reduced back within the defect. The defect was then approximated

## 2025-04-17 NOTE — H&P
Foster Nassar D.O. [facility] GENERAL SURGERY  [address]  [place] [address]  [phone]  New Patient Evaluation in Office    Pt Name: Elyssa Hutchins  Date of Birth 1990   Today's Date: 4/4/2025  Medical Record Number: [id number]  Referring Provider: Gloria Baker APRN*  Primary Care Provider: Gloria Baker APRN - CNP  Chief Complaint   Patient presents with    Surgical Consult     NP refer Gloria Baker CNP-Ventral abdominal wall hernia     ASSESSMENT       Diagnosis Orders   1. Spigelian hernia      left versus lipoma      History reviewed. No pertinent past medical history.       PLANS   Assessment & Plan   Schedule Elyssa for left spigelian hernia repair  The risks, options and alternatives were discussed in the office.  Bleeding, infection, reoperation and recurrence were discussed.  Mesh infection specifically  was discussed, and the possible treatment options were discussed.  The possibility of inadvertent injury to other structures/organs was also covered. The patient was given opportunity to ask questions. Once answered, the patient has agreed to proceed with surgery.  Status: outpatient  Planned anesthesia: general  She will undergo pre-operative clearance per anesthesia guidelines with risk factors listed under the past medical history diagnosis & problem list.     BUSTER Bergeron is a 34 y.o. female seen in the consultation for evaluation of a ventral hernia. Symptoms were first noted several months ago and have gradually worsened since that time. The pain is dull, intermittent, moderate in severity. It is aggravated by Valsalva maneuvers and palliated by rest. She has no additional symptoms. She denies signs or symptoms of incarceration or strangulation. She denies previous wound infection or dehiscence.  Past Medical History  History reviewed. No pertinent past medical history.  Past Surgical History  Past Surgical History:    Procedure Laterality Date    ECTOPIC PREGNANCY SURGERY      WISDOM TOOTH EXTRACTION       Medications  No current outpatient medications on file.     No current facility-administered medications for this visit.     Allergies  has no known allergies.  Family History  family history includes Diabetes in her father; High Blood Pressure in her mother.  Social History   reports that she has never smoked. She has never used smokeless tobacco. She reports current alcohol use. She reports that she does not use drugs.  Health Screening Exams  Health Maintenance   Topic Date Due    Varicella vaccine (1 of 2 - 13+ 2-dose series) Never done    Cervical cancer screen  Never done    COVID-19 Vaccine (1 - 2024-25 season) 03/03/2026 (Originally 9/1/2024)    Flu vaccine (Season Ended) 03/04/2026 (Originally 8/1/2025)    Hepatitis C screen  03/04/2026 (Originally 11/22/2010)    HIV screen  03/04/2026 (Originally 11/22/2007)    Depression Screen  03/04/2026    DTaP/Tdap/Td vaccine (8 - Td or Tdap) 06/12/2034    Hepatitis B vaccine  Completed    Hib vaccine  Completed    Polio vaccine  Completed    Hepatitis A vaccine  Aged Out    HPV vaccine  Aged Out    Meningococcal (ACWY) vaccine  Aged Out    Meningococcal B vaccine  Aged Out    Pneumococcal 0-49 years Vaccine  Aged Out     Review of Systems  Unless otherwise stated in HPI, ROS was unremarkable.     OBJECTIVE    VITALS:  height is 1.524 m (5') and weight is 69.3 kg (152 lb 11.2 oz). Her temporal temperature is 98.2 °F (36.8 °C). Her blood pressure is 116/82 and her pulse is 87. Her oxygen saturation is 98%.    Body mass index is 29.82 kg/m².  CONSTITUTIONAL: Alert and oriented times 3, no acute distress and cooperative to examination with proper mood and affect.  SKIN: Skin color, texture, turgor normal. No rashes or lesions.  LYMPH: no cervical nodes, no inguinal nodes  HEENT: Head is normocephalic, atraumatic. EOMI, PERRLA.  NECK: Supple, symmetrical, trachea midline, no

## 2025-04-17 NOTE — ANESTHESIA PRE PROCEDURE
Neuro/Psych:      (-) seizures and CVA           GI/Hepatic/Renal:        (-) GERD, liver disease and no renal disease       Endo/Other:        (-) diabetes mellitus, hypothyroidism, hyperthyroidism               Abdominal:   (+) obese          Vascular:     - DVT.      Other Findings:       Anesthesia Plan      general     ASA 2     (PIV. Additional access can be obtained after induction if needed. Standard ASA monitors. IV/PO opioids and other adjuncts as needed for pain control. PACU post op for recovery.    Possible anesthetics complications were discussed with the patient, including but not limited to: PONV, damage to the airway and surrounding structures (teeth, lips, gums, tongue, etc.), adverse reactions to medicine, cardiac complications (MI, CHF, arrhythmias, etc.), respiratory complications (post-op ventilation, respiratory failure, etc.), neurologic complications (nerve damage, stroke, seizure), and death. The patient was given the opportunity to ask questions and all questions were answered to the patient's satisfaction. The patient is in agreement with the anesthetic plan.  )  Induction: intravenous.      Anesthetic plan and risks discussed with patient.      Plan discussed with CRNA.                Rob Rogers,    4/17/2025

## 2025-04-17 NOTE — ANESTHESIA POSTPROCEDURE EVALUATION
Department of Anesthesiology  Postprocedure Note    Patient: Elyssa Hutchins  MRN: [id number]  YOB: 1990  Date of evaluation: 4/17/2025    Procedure Summary       Date: 04/17/25 Room / Location: [address]    Anesthesia Start: 0912 Anesthesia Stop: 0948    Procedure: Left spigelian hernia repair (Left) Diagnosis:       Spigelian hernia      (Spigelian hernia [K43.9])    Surgeons: Foster Nassar DO Responsible Provider: Rob Rogers DO    Anesthesia Type: general ASA Status: 2            Anesthesia Type: No value filed.    Joy Phase I:      Joy Phase II:      Anesthesia Post Evaluation    Patient location during evaluation: PACU  Patient participation: complete - patient participated  Level of consciousness: awake and alert  Airway patency: patent  Nausea & Vomiting: no vomiting and no nausea  Cardiovascular status: hemodynamically stable  Respiratory status: acceptable and room air  Hydration status: stable  Pain management: adequate    No notable events documented.

## 2025-04-18 ENCOUNTER — TELEPHONE (OUTPATIENT)
Dept: SURGERY | Age: 35
End: 2025-04-18

## 2025-05-02 NOTE — PROGRESS NOTES
Foster Nassar D.O. [facility] GENERAL SURGERY  [address]  [facility] [address]  Established Patient Evaluation in Office    Pt Name: Elyssa Hutchins  Date of Birth 1990   Today's Date: 5/13/2025  Medical Record Number: [id number]  Referring Provider: No ref. provider found  Primary Care Provider: Gloria Baker APRN - CNP  Chief Complaint   Patient presents with    Follow-up     S/P left spigelian hernia repair with mesh 4/17/25.     ASSESSMENT       Diagnosis Orders   1. Spigelian hernia      S/p left spigelian hernia repair with mesh 4/17/25      Incision is clean, dry and intact or healing as expected   PLANS   Assessment & Plan   Pathology reviewed with the patient who understands. All questions were answered.  Patient Instructions   No lifting, pushing or pulling more than 50 lbs for 2 weeks, then 80 lbs for 2 weeks. No restrictions after 4 weeks. Sutures removed without incident  Follow up: Return for As needed. Instructed to call if any concerns.      SUBJECTIVE      Elyssa is seen today for post-op follow-up. She is S/p left spigelian hernia repair with mesh 4/17/25. She is tolerating a regular diet, having regular bowel movements. Symptoms and activity have gradually improved compared to preoperative. The surgical site is clean and has no drainage. Pain is controlled without any narcotic pain medications. She has compliant with postoperative instructions.  Past Medical History  History reviewed. No pertinent past medical history.  Past Surgical History  Past Surgical History:   Procedure Laterality Date    ECTOPIC PREGNANCY SURGERY      VENTRAL HERNIA REPAIR Left 4/17/2025    Left spigelian hernia repair performed by Foster Nassar DO at [facility] [place] [facility]       Medications  No current outpatient medications on file.     No current facility-administered medications for this visit.     Allergies  has no known

## 2025-05-13 ENCOUNTER — OFFICE VISIT (OUTPATIENT)
Dept: SURGERY | Age: 35
End: 2025-05-13
Payer: MEDICAID

## 2025-05-13 VITALS
WEIGHT: 149 LBS | TEMPERATURE: 97 F | BODY MASS INDEX: 29.25 KG/M2 | SYSTOLIC BLOOD PRESSURE: 126 MMHG | DIASTOLIC BLOOD PRESSURE: 84 MMHG | HEART RATE: 84 BPM | HEIGHT: 60 IN | RESPIRATION RATE: 18 BRPM | OXYGEN SATURATION: 98 %

## 2025-05-13 DIAGNOSIS — K43.9 SPIGELIAN HERNIA: Primary | ICD-10-CM

## 2025-05-13 PROCEDURE — G8419 CALC BMI OUT NRM PARAM NOF/U: HCPCS | Performed by: SURGERY

## 2025-05-13 PROCEDURE — G8427 DOCREV CUR MEDS BY ELIG CLIN: HCPCS | Performed by: SURGERY

## 2025-05-13 PROCEDURE — 1036F TOBACCO NON-USER: CPT | Performed by: SURGERY

## 2025-05-13 PROCEDURE — 99213 OFFICE O/P EST LOW 20 MIN: CPT | Performed by: SURGERY

## 2025-06-10 NOTE — PROGRESS NOTES
Foster Nassar D.O. [facility] GENERAL SURGERY  [address]  [facility] [address]  Established Patient Evaluation in Office    Pt Name: Elyssa Hutchins  Date of Birth 1990   Today's Date: 6/10/2025  Medical Record Number: [id number]  Referring Provider: No ref. provider found  Primary Care Provider: Gloria Baker APRN - CNP  Chief Complaint   Patient presents with    Follow-up     S/P left spigelian hernia repair with mesh 4/17/25, last seen in office 5/13/25     ASSESSMENT       Diagnosis Orders   1. Spigelian hernia      S/p left spigelian hernia repair with mesh 4/17/25      Incision is clean, dry and intact or healing as expected   PLANS   Assessment & Plan   Pathology reviewed with the patient who understands. All questions were answered.  There are no Patient Instructions on file for this visit.Sutures removed without incident  Follow up: No follow-ups on file.      SUBJECTIVE      Elyssa is seen today for post-op follow-up. She is S/p left spigelian hernia repair with mesh 4/17/25. She is tolerating a regular diet, having regular bowel movements. Symptoms and activity have gradually improved compared to preoperative. The surgical site is clean and has no drainage. Pain is controlled without any narcotic pain medications. She has compliant with postoperative instructions.  Past Medical History  No past medical history on file.  Past Surgical History  Past Surgical History:   Procedure Laterality Date    ECTOPIC PREGNANCY SURGERY      VENTRAL HERNIA REPAIR Left 4/17/2025    Left spigelian hernia repair performed by Foster Nassar DO at [facility] OR    WISDOM TOOTH EXTRACTION       Medications  No current outpatient medications on file.     No current facility-administered medications for this visit.     Allergies  has no known allergies.  Social History   reports that she has never smoked. She has never used smokeless tobacco. She reports current

## 2025-06-24 ENCOUNTER — OFFICE VISIT (OUTPATIENT)
Dept: SURGERY | Age: 35
End: 2025-06-24
Payer: MEDICAID

## 2025-06-24 VITALS
SYSTOLIC BLOOD PRESSURE: 121 MMHG | HEIGHT: 60 IN | OXYGEN SATURATION: 98 % | TEMPERATURE: 98 F | DIASTOLIC BLOOD PRESSURE: 80 MMHG | HEART RATE: 92 BPM | BODY MASS INDEX: 29.1 KG/M2

## 2025-06-24 DIAGNOSIS — K43.9 SPIGELIAN HERNIA: Primary | ICD-10-CM

## 2025-06-24 PROCEDURE — 1036F TOBACCO NON-USER: CPT | Performed by: SURGERY

## 2025-06-24 PROCEDURE — G8427 DOCREV CUR MEDS BY ELIG CLIN: HCPCS | Performed by: SURGERY

## 2025-06-24 PROCEDURE — G8419 CALC BMI OUT NRM PARAM NOF/U: HCPCS | Performed by: SURGERY

## 2025-06-24 PROCEDURE — 99213 OFFICE O/P EST LOW 20 MIN: CPT | Performed by: SURGERY

## 2025-06-24 NOTE — PROGRESS NOTES
Foster Nassar D.O. [facility] GENERAL SURGERY  [address]  [facility] [address]  Established Patient Evaluation in Office    Pt Name: Elyssa Hutchins  Date of Birth 1990   Today's Date: 6/24/2025  Medical Record Number: [id number]  Referring Provider: No ref. provider found  Primary Care Provider: Gloria Baker, [place] - CNP  Chief Complaint   Patient presents with    Follow-up     S/P left spigelian hernia repair with mesh 4/17/25, last seen in office 5/13/25     ASSESSMENT       Diagnosis Orders   1. Spigelian hernia      S/p left spigelian hernia repair with mesh 4/17/25      Incision is clean, dry and intact or healing as expected   PLANS   Assessment & Plan   Pathology reviewed with the patient who understands. All questions were answered.  Patient Instructions   May return to full activity without restrictions.   Follow up: Return for As needed. Instructed to call if any concerns.      SUBJECTIVE      Elyssa is seen today for post-op follow-up. She is S/p left spigelian hernia repair with mesh 4/17/25. She is tolerating a regular diet, having regular bowel movements. Symptoms and activity have gradually improved compared to preoperative. The surgical site is clean and has no drainage. Pain is controlled without any narcotic pain medications. She has compliant with postoperative instructions.  Past Medical History  No past medical history on file.  Past Surgical History  Past Surgical History:   Procedure Laterality Date    ECTOPIC PREGNANCY SURGERY      VENTRAL HERNIA REPAIR Left 4/17/2025    Left spigelian hernia repair performed by Foster Nassar DO at [facility] OR    WISDOM TOOTH EXTRACTION       Medications  No current outpatient medications on file.     No current facility-administered medications for this visit.     Allergies  has no known allergies.  Social History   reports that she has never smoked. She has never used smokeless tobacco. She

## (undated) DEVICE — GLOVE SURG SZ 7.5 L11.73IN FNGR THK9.8MIL STRW LTX POLYMER

## (undated) DEVICE — SYRINGE MED 10ML LUERLOCK TIP W/O SFTY DISP

## (undated) DEVICE — SUTURE PROL SZ 2-0 L30IN NONABSORBABLE BLU L26MM SH 1/2 CIR 8833H

## (undated) DEVICE — INTENDED FOR TISSUE SEPARATION, AND OTHER PROCEDURES THAT REQUIRE A SHARP SURGICAL BLADE TO PUNCTURE OR CUT.: Brand: BARD-PARKER ® CARBON RIB-BACK BLADES

## (undated) DEVICE — SHEET, T, LAPAROTOMY, STERILE: Brand: MEDLINE

## (undated) DEVICE — Z DISCONTINUED USE 2220190 SUTURE VICRYL SZ 3-0 L27IN ABSRB UD L26MM SH 1/2 CIR J416H

## (undated) DEVICE — SUTURE PROL SZ 0 L30IN NONABSORBABLE BLU L36MM CT-1 1/2 CIR 8424H

## (undated) DEVICE — SURE SET SINGLE BASIN-LF: Brand: MEDLINE INDUSTRIES, INC.

## (undated) DEVICE — SUREFIT, DUAL DISPERSIVE ELECTRODE, CONTACT QUALITY MONITOR: Brand: SUREFIT

## (undated) DEVICE — 1840 FOAM BLOCK NEEDLE COUNTER: Brand: DEVON

## (undated) DEVICE — HYPODERMIC SAFETY NEEDLE: Brand: MAGELLAN

## (undated) DEVICE — MARKER,SKIN,WI/RULER AND LABELS: Brand: MEDLINE

## (undated) DEVICE — SUTURE ABSORBABLE BRAIDED 2-0 CT-1 27 IN UD VICRYL J259H